# Patient Record
Sex: MALE | Race: WHITE | NOT HISPANIC OR LATINO | URBAN - METROPOLITAN AREA
[De-identification: names, ages, dates, MRNs, and addresses within clinical notes are randomized per-mention and may not be internally consistent; named-entity substitution may affect disease eponyms.]

---

## 2023-10-27 ENCOUNTER — OFFICE VISIT (OUTPATIENT)
Dept: PEDIATRICS | Facility: CLINIC | Age: 18
End: 2023-10-27
Payer: COMMERCIAL

## 2023-10-27 VITALS — WEIGHT: 182 LBS | TEMPERATURE: 98 F

## 2023-10-27 DIAGNOSIS — J01.40 ACUTE NON-RECURRENT PANSINUSITIS: Primary | ICD-10-CM

## 2023-10-27 PROCEDURE — 99214 OFFICE O/P EST MOD 30 MIN: CPT | Performed by: PEDIATRICS

## 2023-10-27 RX ORDER — GUAIFENESIN, PSEUDOEPHEDRINE HYDROCHLORIDE 600; 60 MG/1; MG/1
1 TABLET, EXTENDED RELEASE ORAL EVERY 12 HOURS
Qty: 10 TABLET | Refills: 0 | Status: SHIPPED | OUTPATIENT
Start: 2023-10-27 | End: 2023-11-01

## 2023-10-27 RX ORDER — AZITHROMYCIN 250 MG/1
TABLET, FILM COATED ORAL
Qty: 6 TABLET | Refills: 0 | Status: SHIPPED | OUTPATIENT
Start: 2023-10-27

## 2023-10-27 NOTE — PATIENT INSTRUCTIONS
Healthy young adult s/p flu like sx's with a secondary sinusitis  Start zpack 2 tabs today , then 1 tab a day x 4 days   Start Mucinex -d twice a day x 3-4 days prn sore throat, cough and congestion.  May use vicks and a vaporizer.  Push clear fluids, crackers, toast, etc.  Follow.  Reassured.

## 2023-10-27 NOTE — PROGRESS NOTES
Jerson Schreiber is a 18 y.o. male who presents with   Chief Complaint   Patient presents with    Cough    Nasal Congestion     Fever - for one week - had to come home from college - Here with Mom    .   He is here today with  mom.    HPI  Started with flu symptoms almost 2 weeks ago  Fever- 101-102 tues/wed this week  Has congestion and sore throat  feels weak  Nightime cough- dry to productive  Restless- can't sleep  Appetite is okay  Is drinking   At [employer] [facility]    Objective   Temp 36.7 °C (98 °F)   Wt 82.6 kg (182 lb)     Physical Exam  Physical Exam  Vitals reviewed.   Constitutional:       Appearance: alert in NAD  HENT:      TM's : Rt tm red geneva on it, left dull     Nose and Throat: beefy, boggy congested , sticky mucus, pharynx taz, tonsils strip     Mouth: Mucous membranes are moist.   Eyes:      Conjunctiva/sclera:  normal.   Neck:      Comments: cerv nodes 2+=  Cardiovascular:      Rate and Rhythm: Normal rate and regular rhythm.   Pulmonary:      Effort: Pulmonary effort is normal. increased I:E     Breath sounds: Normal breath sounds.     Assessment/Plan   Problem List Items Addressed This Visit    None  Healthy young adult s/p flu like sx's with a secondary sinusitis  Start zpack 2 tabs today , then 1 tab a day x 4 days   Start Mucinex -d twice a day x 3-4 days prn sore throat, cough and congestion.  May use vicks and a vaporizer.  Push clear fluids, crackers, toast, etc.  Follow.  Reassured.

## 2024-02-17 ENCOUNTER — OFFICE VISIT (OUTPATIENT)
Dept: PEDIATRICS | Facility: CLINIC | Age: 19
End: 2024-02-17
Payer: COMMERCIAL

## 2024-02-17 VITALS — WEIGHT: 186 LBS | TEMPERATURE: 97.3 F

## 2024-02-17 DIAGNOSIS — J01.10 ACUTE FRONTAL SINUSITIS, RECURRENCE NOT SPECIFIED: Primary | ICD-10-CM

## 2024-02-17 LAB — POC RAPID STREP: NEGATIVE

## 2024-02-17 PROCEDURE — 87880 STREP A ASSAY W/OPTIC: CPT | Performed by: NURSE PRACTITIONER

## 2024-02-17 PROCEDURE — 99213 OFFICE O/P EST LOW 20 MIN: CPT | Performed by: NURSE PRACTITIONER

## 2024-02-17 RX ORDER — AMOXICILLIN AND CLAVULANATE POTASSIUM 875; 125 MG/1; MG/1
1 TABLET, FILM COATED ORAL 2 TIMES DAILY
Qty: 20 TABLET | Refills: 0 | Status: SHIPPED | OUTPATIENT
Start: 2024-02-17 | End: 2024-02-27

## 2024-02-17 NOTE — PROGRESS NOTES
Subjective     Jerson Schreiber is a 18 y.o. male who presents for Cough (Started 2 weeks ago-here with mom) and Sore Throat (Started last week).  Today he is accompanied by accompanied by mother.     HPI  Fever and weakness started 2 weeks ago  Treating with mucinex D  Nasal congestion and runny nose  Fever back last night  Sore throat  Wet, congested cough   Green mucous  Headache  Sinus pressure  Increased fatigue    Review of Systems  ROS negative for General, Eyes, ENT, Cardiovascular, GI, , Ortho, Derm, Neuro, Psych, Lymph unless noted in the HPI above.     Objective   Temp 36.3 °C (97.3 °F) (Temporal)   Wt 84.4 kg (186 lb)   BSA: There is no height or weight on file to calculate BSA.  Growth percentiles: Facility age limit for growth %lexi is 20 years. Facility age limit for growth %lexi is 20 years.     Physical Exam  General: Well-developed, well-nourished, alert and oriented, no acute distress  Eyes: Normal sclera, PERRLA, EOMI  ENT: Moderate purulent nasal discharge without sinus tenderness, mildly red throat but not beefy, no petechiae, ears are clear.  Cardiac: Regular rate and rhythm, normal S1/S2, no murmurs.  Pulmonary: Clear to auscultation bilaterally, no work of breathing.  Skin: No rashes  Lymph: No lymphadenopathy    Assessment/Plan   Diagnoses and all orders for this visit:  Acute frontal sinusitis, recurrence not specified  -     amoxicillin-pot clavulanate (Augmentin) 875-125 mg tablet; Take 1 tablet by mouth 2 times a day for 10 days.  -     POCT rapid strep ARVIND Garcia-CNP

## 2024-02-17 NOTE — PATIENT INSTRUCTIONS
Jerson has a sinus infection.  This typically results after a viral infection that turns into the secondary infection in the sinuses.  You can continue to treat the symptoms with decongestants and cough medicines.   We have called in antibiotics as well. Call if symptoms are not improving or worsen.   
English

## 2024-08-01 ENCOUNTER — TELEPHONE (OUTPATIENT)
Dept: PEDIATRICS | Facility: CLINIC | Age: 19
End: 2024-08-01
Payer: COMMERCIAL

## 2024-08-01 NOTE — TELEPHONE ENCOUNTER
Jerson has been having bad anxiety, it has been getting worse plus having stomach issues related to it. Mom said you treated his sister with Fluoxetine and it helped her tremendously. Mom wanted to try and get Jerson in with you before you are on leave, she is adamant about it. She would like roommate forms filled out for College as well. Can you see him August 6th Tuesday at 4:30 ? Thanks!

## 2024-08-03 NOTE — PROGRESS NOTES
Chief Complaint     18 Yr [facility]      Anxiety - social - stressors - self-confidence lacking -   History of Present Illness  Jerson is here today for routine health maintenance with Mom  General Health: Jerson in overall is in good health.   Concerns: . anxiety  Nutrition: Diet is balanced.   Dental Care: Jerson has a dental home. Dental hygiene is regularly performed.    Sleep: Sleep patterns are appropriate.   Behavior/Socialization: Peer relationships are appropriate. Parent-child-sibling interactions are normal. Has a supportive adult relationship.   Developmental/Education: entering Jr year @ [employer]  Employment:  Activities: Jerson engages in regular physical activity. Working out      Sex: not  currently dating.   Drugs (Substance use/abuse): Denies drug use. Denies tobacco use. Denies alcohol use.   Mental Health: A screening questionnaire for depression was negative. Displays self confidence. Has ways to cope with stress. Does not have thoughts of hurting self or has considered suicide. Does not express concerning mental health symptoms.   Safety: Uses safety belts or equipment. Uses sunscreen. There are smoke detectors in the home. Carbon monoxide detectors are used in the home. Is not exposed to second hand smoke. Firearm(s) are not in the home. Has nonviolent peer relationships. Lives in a nonviolent home. Water safety reviewed and practiced.      Review of Systems  ROS negative for General, Eyes, ENT, Cardiovascular, GI, , Ortho, Derm, Neuro, Psych, Lymph unless noted in the HPI above and/or in the problem list. Denies asthma or cardiac symptoms with and without activity. Denies history of LOC or concussion.     Physical Exam  Constitutional: The patient is a well-developed, well-nourished and in no apparent distress. Alert and oriented x3.  HEENT: Head is normocephalic and atraumatic. Extraocular muscles are intact. Pupils are equal, round, and reactive to light and accommodation.  "Nares appeared normal. Mouth is well hydrated and without lesions. Mucous membranes are moist. Posterior pharynx clear of any exudate or lesions.  Neck: Supple. No carotid bruits. No lymphadenopathy or thyromegaly.  Pulmonary: Clear to auscultation. Good air exchange. No effort in breathing.   Cardiovascular: Regular rate and rhythm. No significant murmur not appreciated. Pulses +2=. Carotid WNL.  Abdomen: Soft, nontender, and nondistended. Positive bowel sounds. No hepatosplenomegaly was noted. Abdominal aorta normal.  External Genitalia: WNL for age and development.  Musculoskeletal: Extremities: Without any cyanosis, clubbing, rash, lesions or edema. 2\" Ortho exam WNL. Good strength = bilaterally. FROM. No scoliosis appreciated.  Neurologic: Cranial nerves II through XII are grossly intact. Reflexes + 2 =.  Psychiatric: WNL affect, appropriate interaction.   Skin: No significant rashes or lesions noted.     Patient Discussion/Summary    Today's discussion topics included, but were not limited to the following:.   Jerson growth and development are appropriate for age.   Immunizations: Immunizations are up to date.   Anticipatory Guidance: Adolescent health and safety topics were reviewed.     Teenage Depression Screening: No risks identified.     Impression: Your growth and development is appropriate for age. According to the Body Mass Index (BMI) classification, you are between the 5th and 84th percentile. Health and safety topics were reviewed. Promoted healthy habits through brushing and flossing teeth, visiting a dentist twice a year, limiting TV/screen time , protecting hearing at work, home and concerts, supporting a positive body image, eating a balanced diet and participating in physical activities 60 min daily. Reviewed family time, community involvement and friends/relationships. Discussed dealing with stress, mood changes and sexuality. Topics discussed to support healthy behavior choices included: " "tobacco,inhalants, alcohol, drugs, prescription drugs, abstinence and/or safe sex, use of seat belts, gun safety, conflict resolution, sports/recreation safety, sun safety and Internet and social media safety.   Encourage positive age-appropriate and supportive friendships. Encourage open communication with parents, family and friends.     Jerson and Mom completed the SCARED forms on . The results are as follows:    Total score for  Jerson =39 ; Mom =  37 (a total score > = 25 may indicate the presence of an Anxiety Disorder. Scores > 30 are more specific.    Panic Disorder: Jerson = 12; Mom =   10 ( a score of 7+ is positive)  NELLIE: Jerson =11 ; Mom = 10 (a score of 9+ is positive)  Separation:  Jerson=1 ; Mom = 2 (a score of 5+ is positive)  Social: Jerson  = 12 ; Mom =12  (a score of 8+ is positive)  Significant School Avoidance: Jerson = 3; Mom = 3 ( a score of 3+ is positive)    Interpretation Guidelines: A total score of > = 25 indicate the presence of anxiety Disorder. Scores higher than 30 are more specific.    Recommend yearly physicals to promote well-being and healthy living    Remember to use a condom to prevent unwanted pregnancies or misty an STD.     Plan:  Shopping list:  fiber (makes good poop); culturelle for regularity ; - 1 square of chocolate once to twice a day Pedialax if can't poop; - add olive oil -butter -to diet to help the poop slip out. Place a fun little \"cheapy\" game activity for them to play with in the bathroom; - stool for feet support - little treats/sticker chart for reinforcement       How much fiber does my child need?  Here is an easy way to figure out how much fiber your child needs a day. Start with 5 grams. Then add your child's age. The answer is the number of grams of fiber your child needs each day.Read food labels: check for \"Dietary Fiber\" on the Nutrition Facts label. Look for foods with at least 2 grams of fiber per serving.Some foods are high in " fiber. Try beans, vegetables, fruit (with skin) and whole grains.    Increase water intake.  Call back if no success in 5-7 days.     thank you again and I look forward to seeing and working with you in the future. Stay healthy and happy!!      To further evaluate your health, I have ordered Labs: Complete blood cell count with differential; Complete metabolic panel; fasting lipid panel. Please stop eating at 10 pm the night before the lab draw, then fast until having labs drawn. In addition, if you are prescribed controlled substance medications, you will need to have a drug screen once a year if you wish to continue on your ADHD medication. This is a blanket order for anyone over 18 years old who are prescribed controlled substances (ie. your ADHD medication). If you do NOT have the labs drawn at an [facility], please let me know when and where you had labs drawn. Actions pending lab results:     Thank you for the opportunity and privilege to provide your medical care. I appreciate your trust and confidence in my ability and experience. Thank you again and I look forward to seeing and working with you in the future. Stay healthy and happy!!

## 2024-08-06 ENCOUNTER — OFFICE VISIT (OUTPATIENT)
Dept: PEDIATRICS | Facility: CLINIC | Age: 19
End: 2024-08-06
Payer: COMMERCIAL

## 2024-08-06 VITALS
WEIGHT: 184.38 LBS | SYSTOLIC BLOOD PRESSURE: 130 MMHG | HEIGHT: 72 IN | BODY MASS INDEX: 24.97 KG/M2 | DIASTOLIC BLOOD PRESSURE: 75 MMHG | HEART RATE: 73 BPM

## 2024-08-06 DIAGNOSIS — Z00.00 WELLNESS EXAMINATION: ICD-10-CM

## 2024-08-06 DIAGNOSIS — F41.9 ANXIETY: Primary | ICD-10-CM

## 2024-08-06 DIAGNOSIS — Q87.43: ICD-10-CM

## 2024-08-06 PROCEDURE — 96127 BRIEF EMOTIONAL/BEHAV ASSMT: CPT | Performed by: NURSE PRACTITIONER

## 2024-08-06 PROCEDURE — 99395 PREV VISIT EST AGE 18-39: CPT | Performed by: NURSE PRACTITIONER

## 2024-08-06 PROCEDURE — 3008F BODY MASS INDEX DOCD: CPT | Performed by: NURSE PRACTITIONER

## 2024-08-06 RX ORDER — FLUOXETINE 10 MG/1
10 CAPSULE ORAL DAILY
Qty: 30 CAPSULE | Refills: 11 | Status: SHIPPED | OUTPATIENT
Start: 2024-08-06 | End: 2025-08-06

## 2024-08-06 RX ORDER — PROPRANOLOL HYDROCHLORIDE 10 MG/1
10 TABLET ORAL 3 TIMES DAILY
Qty: 90 TABLET | Refills: 11 | Status: SHIPPED | OUTPATIENT
Start: 2024-08-06 | End: 2025-08-06

## 2025-01-03 ENCOUNTER — TELEPHONE (OUTPATIENT)
Dept: PEDIATRICS | Facility: CLINIC | Age: 20
End: 2025-01-03
Payer: COMMERCIAL

## 2025-01-03 DIAGNOSIS — F41.9 ANXIETY: Primary | ICD-10-CM

## 2025-01-03 NOTE — TELEPHONE ENCOUNTER
Mom didn't know if you still wanted Jerson to get labs done? He is not going to continue with the Prozac, he didn't like how it made him feel. Mom didn't know if they had to do with the medication or not. His sister Lita will be going tomorrow to get her labs done. Mom wanted to let you know that as well.

## 2025-01-04 DIAGNOSIS — G47.19 EXCESSIVE DAYTIME SLEEPINESS: Primary | ICD-10-CM

## 2025-01-04 DIAGNOSIS — Z82.0 FAMILY HISTORY OF MYASTHENIA GRAVIS: ICD-10-CM

## 2025-01-04 DIAGNOSIS — F41.9 ANXIETY: Primary | ICD-10-CM

## 2025-01-04 RX ORDER — CITALOPRAM 10 MG/1
10 TABLET ORAL DAILY
Qty: 30 TABLET | Refills: 0 | Status: SHIPPED | OUTPATIENT
Start: 2025-01-04 | End: 2025-02-03

## 2025-01-04 NOTE — TELEPHONE ENCOUNTER
Spoke to mom. Says that he does not want to go on any medication at this time. He is ok since he has a single room. But will still get the blood work done. Does not thinks he needs anything at this time